# Patient Record
Sex: FEMALE | Race: OTHER | HISPANIC OR LATINO | URBAN - METROPOLITAN AREA
[De-identification: names, ages, dates, MRNs, and addresses within clinical notes are randomized per-mention and may not be internally consistent; named-entity substitution may affect disease eponyms.]

---

## 2017-03-22 ENCOUNTER — EMERGENCY (EMERGENCY)
Facility: HOSPITAL | Age: 52
LOS: 1 days | Discharge: HOME | End: 2017-03-22
Attending: EMERGENCY MEDICINE
Payer: MEDICAID

## 2017-03-22 VITALS
OXYGEN SATURATION: 99 % | HEART RATE: 87 BPM | RESPIRATION RATE: 20 BRPM | TEMPERATURE: 98 F | DIASTOLIC BLOOD PRESSURE: 86 MMHG | SYSTOLIC BLOOD PRESSURE: 146 MMHG | HEIGHT: 60 IN | WEIGHT: 158.73 LBS

## 2017-03-22 DIAGNOSIS — Z88.0 ALLERGY STATUS TO PENICILLIN: ICD-10-CM

## 2017-03-22 DIAGNOSIS — M25.552 PAIN IN LEFT HIP: ICD-10-CM

## 2017-03-22 DIAGNOSIS — M54.32 SCIATICA, LEFT SIDE: ICD-10-CM

## 2017-03-22 PROCEDURE — T1013: CPT

## 2017-03-22 PROCEDURE — 99283 EMERGENCY DEPT VISIT LOW MDM: CPT

## 2017-03-22 RX ORDER — IBUPROFEN 200 MG
1 TABLET ORAL
Qty: 16 | Refills: 0 | OUTPATIENT
Start: 2017-03-22 | End: 2017-03-26

## 2017-03-22 RX ORDER — IBUPROFEN 200 MG
600 TABLET ORAL ONCE
Qty: 0 | Refills: 0 | Status: COMPLETED | OUTPATIENT
Start: 2017-03-22 | End: 2017-03-22

## 2017-03-22 RX ORDER — CYCLOBENZAPRINE HYDROCHLORIDE 10 MG/1
1 TABLET, FILM COATED ORAL
Qty: 10 | Refills: 0 | OUTPATIENT
Start: 2017-03-22 | End: 2017-03-27

## 2017-03-22 RX ADMIN — Medication 600 MILLIGRAM(S): at 21:58

## 2017-03-22 NOTE — ED STATDOCS - MEDICAL DECISION MAKING DETAILS
Impression: sciatica. Will provide motrin and percocet for pain. Discharged on ibuprofen and flexeril

## 2017-03-22 NOTE — ED STATDOCS - MUSCULOSKELETAL, MLM
Tenderness over left sciatic region radiating to left hip. No clubbing cyanosis or edema of extremities.

## 2017-03-22 NOTE — ED STATDOCS - NS ED MD SCRIBE ATTENDING SCRIBE SECTIONS
REVIEW OF SYSTEMS/VITAL SIGNS( Pullset)/PHYSICAL EXAM/DISPOSITION/HIV/HISTORY OF PRESENT ILLNESS/PAST MEDICAL/SURGICAL/SOCIAL HISTORY

## 2017-03-22 NOTE — ED ADULT TRIAGE NOTE - CHIEF COMPLAINT QUOTE
patient states via interperter that she is having pain to left leg and hip that started on Sunday. patient deneis any injury

## 2017-03-22 NOTE — ED STATDOCS - OBJECTIVE STATEMENT
53 y/o female presents c/o left hip and leg pain x 3 days. Denies inciting event/trauma/heavy lifting. Denies any PMHx. Pt has taken Advil w/o relief. No further complaints at this time. Penicillin allergy.

## 2017-12-26 ENCOUNTER — EMERGENCY (EMERGENCY)
Facility: HOSPITAL | Age: 52
LOS: 1 days | End: 2017-12-26
Attending: EMERGENCY MEDICINE
Payer: MEDICAID

## 2017-12-26 VITALS
OXYGEN SATURATION: 99 % | HEIGHT: 60 IN | HEART RATE: 78 BPM | WEIGHT: 164.91 LBS | DIASTOLIC BLOOD PRESSURE: 85 MMHG | RESPIRATION RATE: 18 BRPM | SYSTOLIC BLOOD PRESSURE: 133 MMHG | TEMPERATURE: 98 F

## 2017-12-26 VITALS
TEMPERATURE: 98 F | DIASTOLIC BLOOD PRESSURE: 78 MMHG | HEART RATE: 74 BPM | OXYGEN SATURATION: 100 % | SYSTOLIC BLOOD PRESSURE: 128 MMHG | RESPIRATION RATE: 18 BRPM

## 2017-12-26 PROCEDURE — 96372 THER/PROPH/DIAG INJ SC/IM: CPT

## 2017-12-26 PROCEDURE — 99284 EMERGENCY DEPT VISIT MOD MDM: CPT | Mod: 25

## 2017-12-26 RX ORDER — IBUPROFEN 200 MG
1 TABLET ORAL
Qty: 15 | Refills: 0 | OUTPATIENT
Start: 2017-12-26 | End: 2017-12-30

## 2017-12-26 RX ORDER — OXYCODONE AND ACETAMINOPHEN 5; 325 MG/1; MG/1
1 TABLET ORAL ONCE
Qty: 0 | Refills: 0 | Status: DISCONTINUED | OUTPATIENT
Start: 2017-12-26 | End: 2017-12-26

## 2017-12-26 RX ORDER — KETOROLAC TROMETHAMINE 30 MG/ML
60 SYRINGE (ML) INJECTION ONCE
Qty: 0 | Refills: 0 | Status: DISCONTINUED | OUTPATIENT
Start: 2017-12-26 | End: 2017-12-26

## 2017-12-26 RX ADMIN — OXYCODONE AND ACETAMINOPHEN 1 TABLET(S): 5; 325 TABLET ORAL at 06:27

## 2017-12-26 RX ADMIN — Medication 60 MILLIGRAM(S): at 06:27

## 2017-12-26 RX ADMIN — Medication 300 MILLIGRAM(S): at 06:26

## 2017-12-26 NOTE — ED PROVIDER NOTE - ATTENDING CONTRIBUTION TO CARE
53 yo F presents to ED with son c/o R sided facial pain and swelling with assoc dental pain.  On exam pt with poor dentition and  with noted tenderness over L upper canine with no definitive abscess formation.  Airway intact, no drooling , dysphonia ior dysphagia.  Rx pain meds and Abx with dental f/u ASAP

## 2017-12-26 NOTE — ED PROVIDER NOTE - OBJECTIVE STATEMENT
53 y/o F presents to the ED c/o dental pain 53 y/o F presents to the ED c/o tooth pain which onset 2 days ago. Pt states that her pain started to worsen yesterday. She has taken 600 mg ibuprofen (last dose 10 hours ago) for pain but has had minimal relief. Pt states that she does not have a dentist and says she is allergic to penicillin. She denies tongue pain. No further complaints at this time.

## 2017-12-26 NOTE — ED PROVIDER NOTE - MEDICAL DECISION MAKING DETAILS
Will give pain medication, abx, and discharge home to dental clinic. Pt verbalizes that they understand all instructions and diagnosis.

## 2019-08-06 NOTE — ED ADULT TRIAGE NOTE - AS TEMP SITE
oral Implemented All Fall with Harm Risk Interventions:  [place] to call system. Call bell, personal items and telephone within reach. Instruct patient to call for assistance. Room bathroom lighting operational. Non-slip footwear when patient is off stretcher. Physically safe environment: no spills, clutter or unnecessary equipment. Stretcher in lowest position, wheels locked, appropriate side rails in place. Provide visual cue, wrist band, yellow gown, etc. Monitor gait and stability. Monitor for mental status changes and reorient to person, place, and time. Review medications for side effects contributing to fall risk. Reinforce activity limits and safety measures with patient and family. Provide visual clues: red socks.

## 2023-05-29 ENCOUNTER — EMERGENCY (EMERGENCY)
Facility: HOSPITAL | Age: 58
LOS: 1 days | Discharge: HOME | End: 2023-05-29
Attending: EMERGENCY MEDICINE
Payer: MEDICAID

## 2023-05-29 VITALS
HEART RATE: 82 BPM | OXYGEN SATURATION: 99 % | DIASTOLIC BLOOD PRESSURE: 91 MMHG | TEMPERATURE: 98 F | WEIGHT: 164.91 LBS | SYSTOLIC BLOOD PRESSURE: 145 MMHG | HEIGHT: 65 IN | RESPIRATION RATE: 20 BRPM

## 2023-05-29 PROCEDURE — 99283 EMERGENCY DEPT VISIT LOW MDM: CPT

## 2023-05-29 PROCEDURE — 96372 THER/PROPH/DIAG INJ SC/IM: CPT

## 2023-05-29 PROCEDURE — T1013: CPT

## 2023-05-29 PROCEDURE — 73110 X-RAY EXAM OF WRIST: CPT

## 2023-05-29 PROCEDURE — 99284 EMERGENCY DEPT VISIT MOD MDM: CPT

## 2023-05-29 PROCEDURE — 73110 X-RAY EXAM OF WRIST: CPT | Mod: 26,RT

## 2023-05-29 RX ORDER — IBUPROFEN 200 MG
1 TABLET ORAL
Qty: 28 | Refills: 0
Start: 2023-05-29 | End: 2023-06-04

## 2023-05-29 RX ORDER — KETOROLAC TROMETHAMINE 30 MG/ML
30 SYRINGE (ML) INJECTION ONCE
Refills: 0 | Status: DISCONTINUED | OUTPATIENT
Start: 2023-05-29 | End: 2023-05-29

## 2023-05-29 RX ADMIN — Medication 30 MILLIGRAM(S): at 17:28

## 2023-05-29 NOTE — ED ADULT NURSE REASSESSMENT NOTE - NS ED NURSE REASSESS COMMENT FT1
Pt. A&Ox4 and amb. Denies pain/discomfort. Resp. equal and unlabored b/l. VSS. NAD. Comfort measures provided, safety measures implemented, call bell in reach.

## 2023-05-29 NOTE — ED PROVIDER NOTE - PATIENT PORTAL LINK FT
You can access the FollowMyHealth Patient Portal offered by [facility] by registering at the following website: [email or web address]. By joining [employer]’s FollowMyHealth portal, you will also be able to view your health information using other applications (apps) compatible with our system.

## 2023-05-29 NOTE — ED PROVIDER NOTE - NSFOLLOWUPINSTRUCTIONS_ED_ALL_ED_FT
1) Chad un seguimiento con patterson médico de atención primaria en los próximos 5-7 días. Llame mañana para concertar josie mallorie. Si no puede hacer un seguimiento con patterson médico de atención primaria, regrese al servicio de urgencias por cualquier problema urgente.  2) Se le entregó josie copia de las pruebas realizadas hoy. Traiga los resultados y revíselos con patterson médico de atención primaria.  3) Si tiene algún empeoramiento de los síntomas o cualquier otra inquietud, regrese al servicio de urgencias de inmediato.  4) Continúe tomando john medicamentos caseros según las indicaciones.

## 2023-05-29 NOTE — ED PROVIDER NOTE - OBJECTIVE STATEMENT
57 y/o female with no sign medical history presents to the ED c/o right atraumatic wrist pain that began yesterday. Pt notes she tried to take tylenol with minimal relief of her pain. Notes difficulty moving the wrist due to pain. Denies numbness, tingling, coldness. redness, fevers, chills.

## 2023-05-29 NOTE — ED PROVIDER NOTE - CARE PROVIDER_API CALL
Sanjiv Rodarte  Orthopaedic Surgery  [address]  [place] [address]  Phone: [phone]  Fax: [phone]  Follow Up Time:

## 2023-05-29 NOTE — ED PROVIDER NOTE - CLINICAL SUMMARY MEDICAL DECISION MAKING FREE TEXT BOX
59 y/o female with no sign medical history presents to the ED c/o right atraumatic wrist pain that began yesterday. xrays without evidence of acute fractures, afebrile, and able to range the wrist after toradol, Low suspicion for a septic wrist, likely overuse injury vs. an inflammatory disease such as gout, will place in splint f/u with ortho hand. Pt reassessed, pt feeling better at this time, vss, pt able to walk, talk and vocalized plan of action. Discussed in depth and explained to pt in depth the next steps that need to be taking including proper follow up with PCP or specialists. All incidental findings were discussed with pt as well. Pt verbalized their concerns and all questions were answered. Pt understands dispo and wants discharge. Given good instructions when to return to ED and importance of f/u.

## 2023-05-29 NOTE — ED PROVIDER NOTE - PHYSICAL EXAMINATION
General-alert and oriented to person place and time, nontoxic appearing, pleasant cooperative, NAD  HEENT-normocephalic, atraumatic, NT to palp, EOMI, PERRLA, no conjunctival injections,   MSK- moves all extremities, tender to palp of the right wrist, FROM with passive movement, decreased range of motion with active movement, non warm to touch   Pulses- 2+ RP, < 2 sec cap refill  Back- nt to palp of cervical, thoracic, lumbar spine, nt to palp of paraspinal m., No CVA tenderness  Neuro- no focal deficits, sensation intact

## 2024-04-28 NOTE — ED ADULT TRIAGE NOTE - WEIGHT IN KG
Interval History: No issues overnight, continues to tolerating clears. No abdominal pain. Plan for CT today    Medications:  Continuous Infusions:  Current Facility-Administered Medications   Medication Dose Route Frequency Last Rate Last Admin    heparin (porcine) in D5W  0-40 Units/kg/hr (Adjusted) Intravenous Continuous 9.4 mL/hr at 04/28/24 0809 14 Units/kg/hr at 04/28/24 0809    lactated ringers   Intravenous Continuous 125 mL/hr at 04/27/24 2010 125 mL/hr at 04/27/24 2010     Scheduled Meds:  Current Facility-Administered Medications   Medication Dose Route Frequency    gabapentin  100 mg Oral TID    hydroCHLOROthiazide  12.5 mg Oral Daily    levETIRAcetam  1,000 mg Oral BID    pantoprazole  40 mg Oral Daily    topiramate  100 mg Oral Daily     PRN Meds:  Current Facility-Administered Medications:     acetaminophen, 650 mg, Oral, Q8H PRN    albuterol, 1 puff, Inhalation, Q4H PRN    busPIRone, 10 mg, Oral, TID PRN    calcium carbonate, 500 mg, Oral, BID PRN    dextrose 10 % in water (D10W), 12.5 g, Intravenous, PRN    glucagon (human recombinant), 1 mg, Intramuscular, PRN    heparin (PORCINE), 60 Units/kg (Adjusted), Intravenous, PRN    heparin (PORCINE), 30 Units/kg (Adjusted), Intravenous, PRN    insulin aspart U-100, 0-10 Units, Subcutaneous, Q6H PRN    iohexol, 15 mL, Oral, PRN    LIDOcaine (PF) 10 mg/ml (1%), 1 mL, Intradermal, Once PRN    melatonin, 6 mg, Oral, Nightly PRN    morphine, 4 mg, Intravenous, Q4H PRN    ondansetron, 8 mg, Intravenous, Q8H PRN    sodium chloride 0.9%, 10 mL, Intravenous, PRN     Review of patient's allergies indicates:   Allergen Reactions    Keflex [cephalexin] Hives    Vicodin [hydrocodone-acetaminophen] Itching     itch    Crestor [rosuvastatin] Other (See Comments)     Muscle pain     Objective:     Vital Signs (Most Recent):  Temp: 98 °F (36.7 °C) (04/28/24 0418)  Pulse: 60 (04/28/24 0418)  Resp: 17 (04/28/24 0418)  BP: 127/64 (04/28/24 0418)  SpO2: (!) 94 % (04/28/24  0418) Vital Signs (24h Range):  Temp:  [98 °F (36.7 °C)-98.3 °F (36.8 °C)] 98 °F (36.7 °C)  Pulse:  [58-78] 60  Resp:  [16-18] 17  SpO2:  [93 %-98 %] 94 %  BP: (125-143)/(58-80) 127/64     Weight: 88.4 kg (194 lb 14.2 oz)  Body mass index is 34.53 kg/m².    Intake/Output - Last 3 Shifts         04/26 0700 04/27 0659 04/27 0700 04/28 0659 04/28 0700 04/29 0659    P.O.  360     I.V. (mL/kg)  600 (6.8)     Total Intake(mL/kg)  960 (10.9)     Urine (mL/kg/hr)       Total Output       Net  +960            Urine Occurrence  3 x              Physical Exam  Vitals and nursing note reviewed.   Constitutional:       General: She is not in acute distress.  Cardiovascular:      Rate and Rhythm: Normal rate.      Pulses: Normal pulses.   Pulmonary:      Effort: Pulmonary effort is normal. No respiratory distress.   Abdominal:      General: There is no distension.      Palpations: Abdomen is soft.      Tenderness: There is no abdominal tenderness.      Comments: Incisions clean/dry; soft non-tended and non-distended    Neurological:      Mental Status: She is alert.          Significant Labs:  I have reviewed all pertinent lab results within the past 24 hours.  CBC:   Recent Labs   Lab 04/28/24  0320   WBC 5.62  5.62   RBC 4.25  4.25   HGB 12.4  12.4   HCT 38.4  38.4   *  459*   MCV 90  90   MCH 29.2  29.2   MCHC 32.3  32.3     BMP:   Recent Labs   Lab 04/28/24  0320   *      K 3.7      CO2 20*   BUN 3*   CREATININE 0.8   CALCIUM 10.0   MG 1.8     CMP:   Recent Labs   Lab 04/24/24  1643 04/25/24  0523 04/28/24  0320   *   < > 123*   CALCIUM 9.9   < > 10.0   ALBUMIN 3.2*  --   --    PROT 8.0  --   --       < > 138   K 4.1   < > 3.7   CO2 23   < > 20*      < > 107   BUN 8   < > 3*   CREATININE 0.7   < > 0.8   ALKPHOS 97  --   --    ALT 72*  --   --    AST 53*  --   --    BILITOT 0.5  --   --     < > = values in this interval not displayed.       Significant Diagnostics:  I  have reviewed all pertinent imaging results/findings within the past 24 hours.   74.8

## 2024-12-29 NOTE — ED STATDOCS - CPE ED RESP NORM
Pt to ED for eval of generalized erythremic rash to body x5 days Endorses to itching. Also c/o cough, chills. Denies n/v/d. States when she moves around she feels dizzy. Approx 27 weeks pregnant.   
normal...

## 2025-02-12 ENCOUNTER — EMERGENCY (EMERGENCY)
Facility: HOSPITAL | Age: 60
LOS: 1 days | Discharge: HOME | End: 2025-02-12
Attending: EMERGENCY MEDICINE
Payer: MEDICAID

## 2025-02-12 VITALS
RESPIRATION RATE: 17 BRPM | OXYGEN SATURATION: 98 % | TEMPERATURE: 98 F | SYSTOLIC BLOOD PRESSURE: 140 MMHG | WEIGHT: 156.09 LBS | HEART RATE: 79 BPM | DIASTOLIC BLOOD PRESSURE: 85 MMHG

## 2025-02-12 PROCEDURE — 93971 EXTREMITY STUDY: CPT | Mod: 26,LT

## 2025-02-12 PROCEDURE — 96372 THER/PROPH/DIAG INJ SC/IM: CPT

## 2025-02-12 PROCEDURE — 93971 EXTREMITY STUDY: CPT

## 2025-02-12 PROCEDURE — T1013: CPT

## 2025-02-12 PROCEDURE — 99284 EMERGENCY DEPT VISIT MOD MDM: CPT

## 2025-02-12 PROCEDURE — 73562 X-RAY EXAM OF KNEE 3: CPT

## 2025-02-12 PROCEDURE — 73562 X-RAY EXAM OF KNEE 3: CPT | Mod: 26,LT

## 2025-02-12 PROCEDURE — 99284 EMERGENCY DEPT VISIT MOD MDM: CPT | Mod: 25

## 2025-02-12 RX ORDER — KETOROLAC TROMETHAMINE 10 MG
30 TABLET ORAL ONCE
Refills: 0 | Status: DISCONTINUED | OUTPATIENT
Start: 2025-02-12 | End: 2025-02-12

## 2025-02-12 RX ORDER — ACETAMINOPHEN 160 MG/5ML
975 SUSPENSION ORAL ONCE
Refills: 0 | Status: COMPLETED | OUTPATIENT
Start: 2025-02-12 | End: 2025-02-12

## 2025-02-12 RX ADMIN — ACETAMINOPHEN 975 MILLIGRAM(S): 160 SUSPENSION ORAL at 17:10

## 2025-02-12 RX ADMIN — Medication 30 MILLIGRAM(S): at 17:10

## 2025-02-12 NOTE — ED PROVIDER NOTE - CARE PROVIDER_API CALL
Jose Antonio Avila  Joint Reconstruction  [address] [place] [address]  Phone: [phone]  Fax: [phone]  Follow Up Time:

## 2025-02-12 NOTE — ED ADULT NURSE NOTE - OBJECTIVE STATEMENT
pt a&ox3, vss, c/o L knee pain, denies trauma to site or recent fall. pt in NAD @ this time, respirations even and unlabored, meds gvn per rx, POC discussed w/ patient, will continue to reassess.

## 2025-02-12 NOTE — ED PROVIDER NOTE - OBJECTIVE STATEMENT
Pertinent PMH/PSH/FHx/SHx and Review of Systems contained within:  Patient presents to the ED for atraumatic left knee pain.  Patient wanting son as . PMH of HTN.  VSS.  Pain for one day.  no trauma.  no PE/DVT risk factors.  Otherwise baseline.  no leg edema.  no s/sx infection.  Non toxic.  Well appearing. No fever/chills, No chest pain/palpitations, no SOB/cough/wheeze/stridor, No neck/back pain, no rash, no changes in neurological status/function. Pertinent PMH/PSH/FHx/SHx and Review of Systems contained within:  Patient presents to the ED for atraumatic left knee pain.   present for discussions with patient. . PMH of HTN.  VSS.  Pain for one day.  no trauma.  no PE/DVT risk factors.  Otherwise baseline.  no leg edema.  no s/sx infection.  Non toxic.  Well appearing. No fever/chills, No chest pain/palpitations, no SOB/cough/wheeze/stridor, No neck/back pain, no rash, no changes in neurological status/function.

## 2025-02-12 NOTE — ED PROVIDER NOTE - PHYSICAL EXAMINATION
Gen: Alert, NAD  Head: NC, AT, PERRL, EOMI, normal lids/conjunctiva  ENT: normal hearing, patent oropharynx   Neck: +supple, no meningismus/JVD, +Trachea midline  Pulm:  normal resp effort,   CV:  +dist pulses  Mskel: no edema/erythema/cyanosis, LLE with NV intact and FROM, TTP lateral gastronemius and prox/ant tibia  Skin: no rash  Neuro: AAOx3, no gross sensory/motor deficits

## 2025-02-12 NOTE — ED PROVIDER NOTE - CLINICAL SUMMARY MEDICAL DECISION MAKING FREE TEXT BOX
Patient with leg pain.  As interpreted by undersigned physician, xrays demonstrate no acute pathology. Ultrasound demonstrates no acute pathology. Uneventful ED observation period. Non toxic.  Well appearing. Discussed signs and symptoms and reasons for return with good teachback.  present for discussions with patient.

## 2025-02-12 NOTE — ED ADULT NURSE NOTE - CAS EDP DISCH TYPE
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

## 2025-02-12 NOTE — ED PROVIDER NOTE - PATIENT PORTAL LINK FT
You can access the FollowMyHealth Patient Portal offered by [facility] by registering at the following website: [email or web address]. By joining [employer]’s FollowMyHealth portal, you will also be able to view your health information using other applications (apps) compatible with our system.

## 2025-02-14 DIAGNOSIS — Z88.0 ALLERGY STATUS TO PENICILLIN: ICD-10-CM

## 2025-02-14 DIAGNOSIS — M25.562 PAIN IN LEFT KNEE: ICD-10-CM

## 2025-08-19 ENCOUNTER — EMERGENCY (EMERGENCY)
Facility: HOSPITAL | Age: 60
LOS: 1 days | End: 2025-08-19
Attending: EMERGENCY MEDICINE
Payer: MEDICAID

## 2025-08-19 VITALS
HEIGHT: 61 IN | RESPIRATION RATE: 18 BRPM | SYSTOLIC BLOOD PRESSURE: 129 MMHG | WEIGHT: 164.91 LBS | DIASTOLIC BLOOD PRESSURE: 75 MMHG | OXYGEN SATURATION: 98 % | TEMPERATURE: 98 F | HEART RATE: 75 BPM

## 2025-08-19 PROCEDURE — T1013: CPT

## 2025-08-19 PROCEDURE — 73562 X-RAY EXAM OF KNEE 3: CPT

## 2025-08-19 PROCEDURE — 99283 EMERGENCY DEPT VISIT LOW MDM: CPT

## 2025-08-19 PROCEDURE — 73562 X-RAY EXAM OF KNEE 3: CPT | Mod: 26,RT

## 2025-08-19 PROCEDURE — 99284 EMERGENCY DEPT VISIT MOD MDM: CPT

## 2025-08-19 RX ORDER — LIDOCAINE HYDROCHLORIDE 20 MG/ML
1 JELLY TOPICAL ONCE
Refills: 0 | Status: COMPLETED | OUTPATIENT
Start: 2025-08-19 | End: 2025-08-19

## 2025-08-19 RX ORDER — IBUPROFEN 200 MG
600 TABLET ORAL ONCE
Refills: 0 | Status: COMPLETED | OUTPATIENT
Start: 2025-08-19 | End: 2025-08-19

## 2025-08-19 RX ORDER — IBUPROFEN 200 MG
1 TABLET ORAL
Qty: 10 | Refills: 0
Start: 2025-08-19 | End: 2025-08-22

## 2025-08-19 RX ADMIN — LIDOCAINE HYDROCHLORIDE 1 PATCH: 20 JELLY TOPICAL at 12:00

## 2025-08-19 RX ADMIN — Medication 600 MILLIGRAM(S): at 12:00
